# Patient Record
Sex: MALE | Race: WHITE | NOT HISPANIC OR LATINO | Employment: OTHER | URBAN - METROPOLITAN AREA
[De-identification: names, ages, dates, MRNs, and addresses within clinical notes are randomized per-mention and may not be internally consistent; named-entity substitution may affect disease eponyms.]

---

## 2017-02-01 ENCOUNTER — GENERIC CONVERSION - ENCOUNTER (OUTPATIENT)
Dept: OTHER | Facility: OTHER | Age: 67
End: 2017-02-01

## 2017-02-14 ENCOUNTER — GENERIC CONVERSION - ENCOUNTER (OUTPATIENT)
Dept: OTHER | Facility: OTHER | Age: 67
End: 2017-02-14

## 2018-01-10 NOTE — CONSULTS
Chief Complaint  Pre-op physical total knee replacement, D O S  02/17/2016, [facility]  History of Present Illness  Pre-Op Visit (Brief): The patient is being seen for a preoperative visit  The procedure is a(n) Total knee replacement left scheduled for 2/17/16 with Dr Lauren Gregg  The indication for surgery is Chronic pain and instability left knee  Surgical Risk Assessment:   Prior Anesthesia: He had prior anesthesia, no prior adverse reaction to edidural anesthesia, no prior adverse reaction to spinal anesthesia and no prior adverse reaction to general anesthesia  Pertinent Past Medical History: no angina, no arrhythmia, no CAD, CAD without prior MI, CAD without recent PCI, no CHF, no chronic liver disease, no acute hepatitis, no coagulation delay, no primary hypercoagulable state, no secondary hypercoagulable state, no pulmonary embolism, no DVT, does not use anticoagulants, no diabetes, does not use insulin, no thyroid disease, no neck osteoarthrosis, no TMJ osteoarthrosis, does not wear dentures, no seizure disorder, no CVA, no asthma, no COPD, not PREET, no renal disease, no low serum albumin and no obesity  Exercise Capacity: unable to walk four blocks without symptoms and unable to walk two flights of stairs without symptoms  Lifestyle Factors: denies tobacco use and denies illegal drug use  Symptoms: no easy bleeding, no easy bruising, no frequent nosebleeds, no chest pain, no cough, no dyspnea, no edema, no palpitations and no wheezing  Pertinent Family History: no family history of an adverse reaction to anesthesia, no aneurysm, no bleeding problems, no sudden early deaths, no ischemic heart disease and no stroke  Living Situation: home is secure and supportive and no post-op concerns with his living situation  Review of Systems    Constitutional: No fever or chills, feels well, no tiredness, no recent weight gain or weight loss     ENT: no complaints of earache, no hearing loss, no nosebleeds, no nasal discharge, no sore throat, no hoarseness  Cardiovascular: No complaints of slow heart rate, no fast heart rate, no chest pain, no palpitations, no leg claudication, no lower extremity  Respiratory: No complaints of shortness of breath, no wheezing, no cough, no SOB on exertion, no orthopnea or PND  Gastrointestinal: No complaints of abdominal pain, no constipation, no nausea or vomiting, no diarrhea or bloody stools  Genitourinary: No complaints of dysuria, no incontinence, no hesitancy, no nocturia, no genital lesion, no testicular pain  Musculoskeletal: No complaints of arthralgia, no myalgias, no joint swelling or stiffness, no limb pain or swelling  Integumentary: No complaints of skin rash or skin lesions, no itching, no skin wound, no dry skin  Neurological: No compliants of headache, no confusion, no convulsions, no numbness or tingling, no dizziness or fainting, no limb weakness, no difficulty walking  Psychiatric: Is not suicidal, no sleep disturbances, no anxiety or depression, no change in personality, no emotional problems  Endocrine: No complaints of proptosis, no hot flashes, no muscle weakness, no erectile dysfunction, no deepening of the voice, no feelings of weakness  Hematologic/Lymphatic: No complaints of swollen glands, no swollen glands in the neck, does not bleed easily, no easy bruising  Active Problems    1  Bilateral hearing loss (389 9) (H91 93)   2  Hyperlipidemia (272 4) (E78 5)   3   Osteoarthritis of both knees (715 96) (M17 0)    Past Medical History    · History of Colon cancer screening (V76 51) (Z12 11)   · History of Diabetes mellitus screening (V77 1) (Z13 1)   · History of acute bronchitis (V12 69) (Z87 09)   · History of acute sinusitis (V12 69) (Z87 09)   · History of impacted cerumen (V12 49) (Z86 69)   · History of Microscopic hematuria (599 72) (R31 2)   · History of Need for pneumococcal vaccination (V03 82) (Z23)   · History of Prostate cancer screening (V76 44) (Z12 5)   · History of Vaccine for VZV (varicella-zoster virus) (V05 4) (Z23)    The active problems and past medical history were reviewed and updated today  Surgical History    · History of Appendectomy    The surgical history was reviewed and updated today  Family History    · Family history of cardiac disorder (V17 49) (Z82 49)   · Family history of diabetes mellitus (V18 0) (Z83 3)    · Family history of Type 2 Diabetes Mellitus    The family history was reviewed and updated today  Social History    · Exercising Regularly   · Former smoker (K31 66) (U30 737)   · Marital History - Currently    · Working Full Time  The social history was reviewed and updated today  The social history was reviewed and is unchanged  Current Meds   1  Simvastatin 40 MG Oral Tablet; TAKE 1 TABLET DAILY; Therapy: 70KLT5483 to (Evaluate:47Ong4826)  Requested for: 58IPI4724; Last   Rx:06Jan2016 Ordered    Allergies    1  Sulfa Drugs    Vitals  Signs [Data Includes: Current Encounter]    Systolic: 025  Diastolic: 80   Heart Rate: 68  Systolic: 512, LUE, Sitting  Diastolic: 88, LUE, Sitting  Height: 5 ft 10 5 in  Weight: 199 lb 8 0 oz  BMI Calculated: 28 22  BSA Calculated: 2 1    Physical Exam    Constitutional   General appearance: No acute distress, well appearing and well nourished  Head and Face   Head and face: Normal     Palpation of the face and sinuses: No sinus tenderness  Eyes   Conjunctiva and lids: No erythema, swelling or discharge  Pupils and irises: Equal, round, reactive to light  Ears, Nose, Mouth, and Throat   External inspection of ears and nose: Normal     Otoscopic examination: Tympanic membranes translucent with normal light reflex  Canals patent without erythema  Hearing: Normal     Nasal mucosa, septum, and turbinates: Normal without edema or erythema  Lips, teeth, and gums: Normal, good dentition      Oropharynx: Normal with no erythema, edema, exudate or lesions  Neck   Neck: Supple, symmetric, trachea midline, no masses  Thyroid: Normal, no thyromegaly  Pulmonary   Auscultation of lungs: Clear to auscultation  Cardiovascular   Auscultation of heart: Normal rate and rhythm, normal S1 and S2, no murmurs  Carotid pulses: 2+ bilaterally  Abdominal aorta: Normal     Femoral pulses: 2+ bilaterally  Pedal pulses: 2+ bilaterally  Peripheral vascular exam: Normal     Examination of extremities for edema and/or varicosities: Normal     Chest   Breasts: Normal, no dimpling or skin changes appreciated  Palpation of breasts and axillae: Normal, no masses palpated  Chest: Normal     Abdomen   Abdomen: Non-tender, no masses  Liver and spleen: No hepatomegaly or splenomegaly  Examination for hernias: No hernias appreciated  Genitourinary   Scrotal contents: Normal testes, no masses  Penis: Normal, no lesions  Lymphatic   Palpation of lymph nodes in neck: No lymphadenopathy  Palpation of lymph nodes in axillae: No lymphadenopathy  Palpation of lymph nodes in groin: No lymphadenopathy  Palpation of lymph nodes in other areas: No lymphadenopathy  Musculoskeletal   Gait and station: Normal     Inspection/palpation of digits and nails: Normal without clubbing or cyanosis  Inspection/palpation of joints, bones, and muscles: Normal     Range of motion: Normal     Stability: Normal     Muscle strength/tone: Normal     Skin   Skin and subcutaneous tissue: Normal without rashes or lesions  Palpation of skin and subcutaneous tissue: Normal turgor  Neurologic   Cranial nerves: Cranial nerves 2-12 intact  Reflexes: 2+ and symmetric  Sensation: No sensory loss  Coordination: Normal finger to nose and heel to shin  Psychiatric   Judgment and insight: Normal     Orientation to person, place and time: Normal     Recent and remote memory: Intact      Mood and affect: Normal        Assessment 1  Primary osteoarthritis of left knee (715 16) (M17 12)    Plan  Primary osteoarthritis of left knee    · Continue with our present treatment plan ; Status:Complete;   Done: 20JYB3265    Discussion/Summary  Surgical Clearance: He is at a LOW risk from a cardiovascular standpoint at this time without any additional cardiac testing  Reevaluation needed, if he should present with symptoms prior to surgery/procedure  Comments:  Patient is medically cleared for left total knee replacement under general and spinal anesthesia pending cardiac clearance  Attachments: Lab chest x-ray EKG and prior chemical stress test performed 212      54-year-old male here for preoperative medical clearance  Patient is medically cleared for left total knee replacement under general and spinal anesthesia pending cardiac clearance which is to be obtained  I have performed an examination today there are no new findings  His only active problem at this time his hyperlipidemia for which he takes simvastatin  I reviewed his labs x-ray EKG and prior nuclear stress testing all tests are in order  His EKG abnormalities have been present for several years however cardiac clearance is to be obtained in the near future  He has no symptoms or signs of cardiac disease  End of Encounter Meds    1  Simvastatin 40 MG Oral Tablet; TAKE 1 TABLET DAILY;    Therapy: 63AKV7907 to (Evaluate:34Gxj1366)  Requested for: 45NMY1161; Last   KR:[id number] Ordered    Signatures   Electronically signed by : RAHEEM Funez ; Feb 4 2016 10:32AM EST                       (Author)

## 2018-01-11 NOTE — PROGRESS NOTES
Assessment    1  Primary osteoarthritis of left knee (715 16) (M17 12)    Plan  Primary osteoarthritis of left knee    · Continue with our present treatment plan ; Status:Complete;   Done: [id number]    Discussion/Summary  Surgical Clearance: He is at a LOW risk from a cardiovascular standpoint at this time without any additional cardiac testing  Reevaluation needed, if he should present with symptoms prior to surgery/procedure  Comments:  Patient is medically cleared for left total knee replacement under general and spinal anesthesia pending cardiac clearance  Attachments: Lab chest x-ray EKG and prior chemical stress test performed 212      56-year-old male here for preoperative medical clearance  Patient is medically cleared for left total knee replacement under general and spinal anesthesia pending cardiac clearance which is to be obtained  I have performed an examination today there are no new findings  His only active problem at this time his hyperlipidemia for which he takes simvastatin  I reviewed his labs x-ray EKG and prior nuclear stress testing all tests are in order  His EKG abnormalities have been present for several years however cardiac clearance is to be obtained in the near future  He has no symptoms or signs of cardiac disease  Chief Complaint  Pre-op physical total knee replacement, D O S  02/17/2016, [facility]  History of Present Illness  Pre-Op Visit (Brief): The patient is being seen for a preoperative visit  The procedure is a(n) Total knee replacement left scheduled for 2/17/16 with Dr Jennifer Robledo  The indication for surgery is Chronic pain and instability left knee  Surgical Risk Assessment:   Prior Anesthesia: He had prior anesthesia, no prior adverse reaction to edidural anesthesia, no prior adverse reaction to spinal anesthesia and no prior adverse reaction to general anesthesia   Pertinent Past Medical History: no angina, no arrhythmia, no CAD, CAD without prior MI, CAD without recent PCI, no CHF, no chronic liver disease, no acute hepatitis, no coagulation delay, no primary hypercoagulable state, no secondary hypercoagulable state, no pulmonary embolism, no DVT, does not use anticoagulants, no diabetes, does not use insulin, no thyroid disease, no neck osteoarthrosis, no TMJ osteoarthrosis, does not wear dentures, no seizure disorder, no CVA, no asthma, no COPD, not PREET, no renal disease, no low serum albumin and no obesity  Exercise Capacity: unable to walk four blocks without symptoms and unable to walk two flights of stairs without symptoms  Lifestyle Factors: denies tobacco use and denies illegal drug use  Symptoms: no easy bleeding, no easy bruising, no frequent nosebleeds, no chest pain, no cough, no dyspnea, no edema, no palpitations and no wheezing  Pertinent Family History: no family history of an adverse reaction to anesthesia, no aneurysm, no bleeding problems, no sudden early deaths, no ischemic heart disease and no stroke  Living Situation: home is secure and supportive and no post-op concerns with his living situation  Review of Systems    Constitutional: No fever or chills, feels well, no tiredness, no recent weight gain or weight loss  ENT: no complaints of earache, no hearing loss, no nosebleeds, no nasal discharge, no sore throat, no hoarseness  Cardiovascular: No complaints of slow heart rate, no fast heart rate, no chest pain, no palpitations, no leg claudication, no lower extremity  Respiratory: No complaints of shortness of breath, no wheezing, no cough, no SOB on exertion, no orthopnea or PND  Gastrointestinal: No complaints of abdominal pain, no constipation, no nausea or vomiting, no diarrhea or bloody stools  Genitourinary: No complaints of dysuria, no incontinence, no hesitancy, no nocturia, no genital lesion, no testicular pain     Musculoskeletal: No complaints of arthralgia, no myalgias, no joint swelling or stiffness, no limb pain or swelling  Integumentary: No complaints of skin rash or skin lesions, no itching, no skin wound, no dry skin  Neurological: No compliants of headache, no confusion, no convulsions, no numbness or tingling, no dizziness or fainting, no limb weakness, no difficulty walking  Psychiatric: Is not suicidal, no sleep disturbances, no anxiety or depression, no change in personality, no emotional problems  Endocrine: No complaints of proptosis, no hot flashes, no muscle weakness, no erectile dysfunction, no deepening of the voice, no feelings of weakness  Hematologic/Lymphatic: No complaints of swollen glands, no swollen glands in the neck, does not bleed easily, no easy bruising  Active Problems    1  Bilateral hearing loss (389 9) (H91 93)   2  Hyperlipidemia (272 4) (E78 5)   3  Osteoarthritis of both knees (715 96) (M17 0)    Past Medical History    · History of Colon cancer screening (V76 51) (Z12 11)   · History of Diabetes mellitus screening (V77 1) (Z13 1)   · History of acute bronchitis (V12 69) (Z87 09)   · History of acute sinusitis (V12 69) (Z87 09)   · History of impacted cerumen (V12 49) (Z86 69)   · History of Microscopic hematuria (599 72) (R31 2)   · History of Need for pneumococcal vaccination (V03 82) (Z23)   · History of Prostate cancer screening (V76 44) (Z12 5)   · History of Vaccine for VZV (varicella-zoster virus) (V05 4) (Z23)    The active problems and past medical history were reviewed and updated today  Surgical History    · History of Appendectomy    The surgical history was reviewed and updated today  Family History    · Family history of cardiac disorder (V17 49) (Z82 49)   · Family history of diabetes mellitus (V18 0) (Z83 3)    · Family history of Type 2 Diabetes Mellitus    The family history was reviewed and updated today         Social History    · Exercising Regularly   · Former smoker (A09 46) (W58 008)   · Marital History - Currently    · Working Full Time  The social history was reviewed and updated today  The social history was reviewed and is unchanged  Current Meds   1  Simvastatin 40 MG Oral Tablet; TAKE 1 TABLET DAILY; Therapy: 48DYZ2907 to (Evaluate:45Ecj5500)  Requested for: 02PSW5351; Last   Rx:[id number] Ordered    Allergies    1  Sulfa Drugs    Vitals   Recorded: 52KWD5894 10:19AM Recorded: 40BGB5990 10:03AM   Heart Rate  68   Systolic 688 997, LUE, Sitting   Diastolic 80 88, LUE, Sitting   Height  5 ft 10 5 in   Weight  199 lb 8 0 oz   BMI Calculated  28 22   BSA Calculated  2 1     Physical Exam    Constitutional   General appearance: No acute distress, well appearing and well nourished  Head and Face   Head and face: Normal     Palpation of the face and sinuses: No sinus tenderness  Eyes   Conjunctiva and lids: No erythema, swelling or discharge  Pupils and irises: Equal, round, reactive to light  Ears, Nose, Mouth, and Throat   External inspection of ears and nose: Normal     Otoscopic examination: Tympanic membranes translucent with normal light reflex  Canals patent without erythema  Hearing: Normal     Nasal mucosa, septum, and turbinates: Normal without edema or erythema  Lips, teeth, and gums: Normal, good dentition  Oropharynx: Normal with no erythema, edema, exudate or lesions  Neck   Neck: Supple, symmetric, trachea midline, no masses  Thyroid: Normal, no thyromegaly  Pulmonary   Auscultation of lungs: Clear to auscultation  Cardiovascular   Auscultation of heart: Normal rate and rhythm, normal S1 and S2, no murmurs  Carotid pulses: 2+ bilaterally  Abdominal aorta: Normal     Femoral pulses: 2+ bilaterally  Pedal pulses: 2+ bilaterally  Peripheral vascular exam: Normal     Examination of extremities for edema and/or varicosities: Normal     Chest   Breasts: Normal, no dimpling or skin changes appreciated  Palpation of breasts and axillae: Normal, no masses palpated      Chest: Normal     Abdomen   Abdomen: Non-tender, no masses  Liver and spleen: No hepatomegaly or splenomegaly  Examination for hernias: No hernias appreciated  Genitourinary   Scrotal contents: Normal testes, no masses  Penis: Normal, no lesions  Lymphatic   Palpation of lymph nodes in neck: No lymphadenopathy  Palpation of lymph nodes in axillae: No lymphadenopathy  Palpation of lymph nodes in groin: No lymphadenopathy  Palpation of lymph nodes in other areas: No lymphadenopathy  Musculoskeletal   Gait and station: Normal     Inspection/palpation of digits and nails: Normal without clubbing or cyanosis  Inspection/palpation of joints, bones, and muscles: Normal     Range of motion: Normal     Stability: Normal     Muscle strength/tone: Normal     Skin   Skin and subcutaneous tissue: Normal without rashes or lesions  Palpation of skin and subcutaneous tissue: Normal turgor  Neurologic   Cranial nerves: Cranial nerves 2-12 intact  Reflexes: 2+ and symmetric  Sensation: No sensory loss  Coordination: Normal finger to nose and heel to shin  Psychiatric   Judgment and insight: Normal     Orientation to person, place and time: Normal     Recent and remote memory: Intact  Mood and affect: Normal        End of Encounter Meds    1  Simvastatin 40 MG Oral Tablet; TAKE 1 TABLET DAILY; Therapy: 13CAJ7364 to (Claude Seashore)  Requested for: 02THL6215; Last   Rx:06Jan2016 Ordered    Future Appointments    Date/Time Provider Specialty Site   12/19/2016 01:00 PM RAHEEM Hancock   Family Medicine [facility]     Signatures   Electronically signed by : RAHEEM Dsouza ; Feb 4 2016 10:32AM EST                       (Author)

## 2018-01-17 NOTE — PROGRESS NOTES
Assessment    1  Encounter for preventive health examination (V70 0) (Z00 00)    Plan  Health Maintenance    · Alcohol misuse can be a problem with advancing age ; Status:Complete;   Done:  15Dld1797   · Always use a seat belt and shoulder strap when riding or driving a motor vehicle ;  Status:Complete;   Done: 78JOD6462   · Take steps to avoid hypothermia ; Status:Complete;   Done: 67QKP4405   · There are many exercise options for seniors ; Status:Complete;   Done: 69XPA3261   · These are things you can do to prevent falls in and around the home ; Status:Complete;    Done: 08MKG7670   · Use a sun block product with an SPF of 15 or more ; Status:Complete;   Done:  26JDV9389   · We encourage all of our patients to exercise regularly  30 minutes of exercise or physical  activity five or more days a week is recommended for children and adults ;  Status:Complete;   Done: 37OLU7857   · We recommend routine visits to a dentist ; Status:Complete;   Done: 31VCL6619   · We recommend that you create an advance directive ; Status:Complete;   Done:  22BKF7359   · We recommend that you follow the "Mediterranean diet "; Status:Complete;   Done:  80BQT3835   · We recommend that you follow these rules for gun safety ; Status:Complete;   Done:  11GJG3400   · We recommend that you follow these steps to lower your risk of osteoporosis  ;  Status:Complete;   Done: 99NTL4053   · We recommend you modify your diet to achieve and maintain a healthy weight  Being  underweight may increase your risk of developing health problems from vitamin and  mineral deficiencies  We recommend a balanced diet rich in fruits and vegetables  You  may also consider increasing your calorie intake by eating more frequently or adding  nuts, avocados, and low-fat cheese or milk to your meals  Please let us know  if you would like to learn more about your nutrition and calorie needs, and additional  options to help you achieve your weight goals  ; Status:Complete;   Done: 28BTU3511   · Call [phone] if: You have any warning signs of skin cancer ; Status:Complete;    Done: 44DRK4421   · Call 911 if: You experience a new kind of chest pain (angina) or pressure ;  Status:Complete;   Done: 09GFQ7520   · Follow-up visit in 1 year Evaluation and Treatment  Follow-up  Status: Hold For -  Scheduling  Requested for: 25Tem6913  Need for pneumococcal vaccination    · Prevnar 13 Intramuscular Suspension  Screening for colon cancer    · COLONOSCOPY; Status:Active; Requested for:12Fuq9949;    · Swapna Brown MD, [facility] Arenas  (Gastroenterology) Physician Referral  Consult Only: the  expectation is that the referring provider will communicate back to the patient on  treatment options  Evaluation and Treatment: the expectation is that the referred to  provider will communicate back to the patient on treatment options  Status: Active   Requested for: 67Qeh8941  Care Summary provided  : Yes    Discussion/Summary    9the 8year-old man here today for subsequent annual wellness visit  I'm giving him the 5 wishes and instructing him to prepare living will for himself  I'm giving him Prevnar 15 and a referral for colonoscopy which she is overdue  He is had all the other preventive services  Limited examination performed today including prostate examination no new findings  I reviewed his overall management and his medication will make no other changes  Will reappoint in one year for  Impression: Subsequent Annual Wellness Visit  Cardiovascular screening and counseling: the risks and benefits of screening were discussed, screening is current and EKG recommended  Diabetes screening and counseling: the risks and benefits of screening were discussed and screening is current  Colorectal cancer screening and counseling: the risks and benefits of screening were discussed and due for a colonoscopy (low risk)     Prostate cancer screening and counseling: the risks and benefits of screening were discussed and screening is current  Osteoporosis screening and counseling: the risks and benefits of screening were discussed and screening not indicated  Abdominal aortic aneurysm screening and counseling: the risks and benefits of screening were discussed and screening not indicated  Glaucoma screening and counseling: the risks and benefits of screening were discussed and screening is current  HIV screening and counseling: the risks and benefits of screening were discussed and screening not indicated  Immunizations: influenza vaccine is up to date this year, the risks and benefits of pneumococcal vaccination were discussed with the patient, pneumococcal vaccine due today, Cglihiz50, hepatitis B prevention counseling was provided, hepatitis B vaccination series is not indicated at this time due to the patient's low risk of isis the disease, the risks and benefits of the Zostavax vaccine were discussed with the patient, Zostavax vaccination status is unknown, the risks and benefits of the Tdap vaccine were discussed with the patient and Tdap vaccination up to date  Advance Directive Planning: not complete  Patient Discussion: plan discussed with the patient, follow-up visit needed in one year  Chief Complaint  77 y/o annual physical emax      Advance Directives  Advance Directive [facility]:   NO - Patient does not have an advance health care directive  Summary of Advance Directive Conversation  Wishes given to the patient and instructed in  The provider spent 1 minutes discussing Advance Directives  History of Present Illness  Welcome to Medicare and Wellness Visits: The patient is being seen for the subsequent annual wellness visit  Medicare Screening and Risk Factors   Hospitalizations: no previous hospitalizations  Once per lifetime medicare screening tests: ECG (1/19/16LBBB) and AAA screening US has not yet been done    Medicare Screening Tests Risk Questions Abdominal aortic aneurysm risk assessment: none indicated  Osteoporosis risk assessment: none indicated  HIV risk assessment: none indicated  Drug and Alcohol Use: The patient is a former cigarette smoker  The patient reports occasional alcohol use  He has never used illicit drugs  Diet and Physical Activity: Current diet includes well balanced meals  He exercises 3 times per week  Exercise: strength training 3 hours per week  Mood Disorder and Cognitive Impairment Screening: Anxiety screening none  He denies feeling down, depressed, or hopeless over the past two weeks  He denies feeling little interest or pleasure in doing things over the past two weeks  Cognitive impairment screening: denies difficulty learning/retaining new information, denies difficulty handling complex tasks, denies difficulty with reasoning, denies difficulty with spatial ability and orientation and denies difficulty with language  Functional Ability/Level of Safety: Hearing is significantly decreased and a hearing aid is used  The patient is currently able to do activities of daily living without limitations, able to do instrumental activities of daily living without limitations, able to participate in social activities without limitations and able to drive without limitations  Activities of daily living details: does not need help using the phone, no transportation help needed, does not need help shopping, no meal preparation help needed, does not need help doing housework, does not need help doing laundry, does not need help managing medications and does not need help managing money  Fall risk factors: The patient fell 0 times in the past 12 months , no polypharmacy, no alcohol use, no mobility impairment, no antidepressant use, no deconditioning, no postural hypotension, no sedative use, no urinary incontinence, no antihypertensive use, no cognitive impairment, up and go test was normal and no previous fall   Home safety risk factors:  no unfamiliar surroundings, no loose rugs, no poor household lighting, no uneven floors, no household clutter and grab bars in the bathroom  Advance Directives: Advance directives: no living will  Co-Managers and Medical Equipment/Suppliers: See Patient Care Team   Reviewed Updated [facility]:   Last Medicare Wellness Visit Information was reviewed, patient interviewed, no change since last AWV  Review of Systems    Constitutional: negative  Eyes: negative  ENT: negative  Cardiovascular: negative  Respiratory: negative  Gastrointestinal: negative  Genitourinary: negative  Musculoskeletal: negative  Integumentary and Breasts: negative  Neurological: negative  Psychiatric: negative  Endocrine: negative  Hematologic and Lymphatic: negative  Active Problems    1  Bilateral hearing loss (389 9) (H91 93)   2  Hyperlipidemia (272 4) (E78 5)   3  Osteoarthritis of both knees (715 96) (M17 0)   4  Primary osteoarthritis of left knee (715 16) (M17 12)   5  Prostate cancer screening (V76 44) (Z12 5)    Past Medical History    · History of Acute laryngotracheitis (464 20) (J04 2)   · History of Colon cancer screening (V76 51) (Z12 11)   · History of Diabetes mellitus screening (V77 1) (Z13 1)   · History of acute bronchitis (V12 69) (Z87 09)   · History of acute sinusitis (V12 69) (Z87 09)   · History of impacted cerumen (V12 49) (Z86 69)   · History of Microscopic hematuria (599 72) (R31 29)   · History of Need for pneumococcal vaccination (V03 82) (Z23)   · History of Prostate cancer screening (V76 44) (Z12 5)   · History of Vaccine for VZV (varicella-zoster virus) (V05 4) (Z23)    The active problems and past medical history were reviewed and updated today  Surgical History    · History of Appendectomy    The surgical history was reviewed and updated today         Family History  Father    · Family history of cardiac disorder (V17 49) (Z82 49)   · Family history of diabetes mellitus (V18 0) (Z83 3)  Family History    · Family history of Type 2 Diabetes Mellitus    The family history was reviewed and updated today  Social History    · Exercising Regularly   · Former smoker (R93 40) (H15 801)   · Marital History - Currently    · Working Full Time  The social history was reviewed and updated today  The social history was reviewed and is unchanged  Current Meds   1  Simvastatin 40 MG Oral Tablet; TAKE 1 TABLET DAILY; Therapy: 78EPC2399 to (Evaluate:58Swf9634)  Requested for: 79SJD8855; Last   Rx:27Sfm7713 Ordered    The medication list was reviewed and updated today  Allergies    1  Sulfa Drugs    Immunizations   1 2    Influenza  24-Oct-2016     PPSV  Temporarily Deferred: Pt requests deferral Temporarily Deferred: Pt refuses    Zoster  Temporarily Deferred: Pt requests deferral      Vitals  Signs    Systolic: 446  Diastolic: 80   Temperature: 97 5 F, Tympanic  Heart Rate: 74, L Radial  Pulse Quality: Norm  Systolic: 607, LUE, Sitting  Diastolic: 90, LUE, Sitting  Height: 5 ft 10 5 in  Weight: 195 lb 8 0 oz  BMI Calculated: 27 65  BSA Calculated: 2 08    Physical Exam    Constitutional   General appearance: No acute distress, well appearing and well nourished  Eyes   Pupils and irises: Equal, round, reactive to light  Ears, Nose, Mouth, and Throat   Otoscopic examination: Tympanic membranes translucent with normal light reflex  Canals patent without erythema  Hearing: Abnormal   Hearing in the right ear: diminished  Hearing in the left ear: dimished  Oropharynx: Normal with no erythema, edema, exudate or lesions  Neck   Neck: Supple, symmetric, trachea midline, no masses  Thyroid: Normal, no thyromegaly  Pulmonary   Auscultation of lungs: Clear to auscultation  Cardiovascular   Auscultation of heart: Normal rate and rhythm, normal S1 and S2, no murmurs  Genitourinary   Scrotal contents: Normal testes, no masses      Penis: Normal, no lesions  Digital rectal exam of prostate: Abnormal   The prostate was enlarged, but had no palpable nodules, was nontender and was not fluctuant  Results/Data  Falls Risk Assessment (Dx Z13 89 Screen for Neurologic Disorder) 01Xof1499 11:06AM [facility]     Test Name Result Flag Reference   Falls Risk      No falls in the past year     PHQ-2 Adult Depression Screening 90Iyq2120 11:06AM [facility]     Test Name Result Flag Reference   PHQ-2 Adult Depression Score 0     Over the last two weeks, how often have you been bothered by any of the following problems? Little interest or pleasure in doing things: Not at all - 0  Feeling down, depressed, or hopeless: Not at all - 0   PHQ-2 Adult Depression Screening Negative         Procedure    Procedure: Visual Acuity Test    Indication: routine screening  Inforrmation supplied by a Snellen chart  Results: 20/25 in the right eye with corrective device, 20/20 in the left eye with corrective device      Health Management  Health Maintenance   (1) PSA (SCREEN) (Dx V76 44 Screen for Prostate Cancer); every 1 week; Last  26WIG0540; Next Due: 58Mno0814; Overdue    Future Appointments    Date/Time Provider Specialty Site   01/12/2018 01:00 PM RAHEEM King   Family Medicine [facility], [place], [facility]     Signatures   Electronically signed by : RAHEEM Winston ; Dec 23 2016 11:53AM EST                       (Author)

## 2018-03-08 DIAGNOSIS — Z13.6 SCREENING FOR CARDIOVASCULAR CONDITION: ICD-10-CM

## 2018-03-08 DIAGNOSIS — Z13.1 SCREENING FOR DIABETES MELLITUS: ICD-10-CM

## 2018-03-08 DIAGNOSIS — Z12.5 SCREENING PSA (PROSTATE SPECIFIC ANTIGEN): ICD-10-CM

## 2018-03-08 DIAGNOSIS — E78.5 DYSLIPIDEMIA: Primary | ICD-10-CM

## 2018-03-08 DIAGNOSIS — M17.0 PRIMARY OSTEOARTHRITIS OF BOTH KNEES: ICD-10-CM

## 2018-03-13 ENCOUNTER — APPOINTMENT (OUTPATIENT)
Dept: LAB | Facility: CLINIC | Age: 68
End: 2018-03-13
Payer: MEDICARE

## 2018-03-13 DIAGNOSIS — E78.5 DYSLIPIDEMIA: ICD-10-CM

## 2018-03-13 DIAGNOSIS — Z13.6 SCREENING FOR CARDIOVASCULAR CONDITION: ICD-10-CM

## 2018-03-13 DIAGNOSIS — Z13.1 SCREENING FOR DIABETES MELLITUS: ICD-10-CM

## 2018-03-13 DIAGNOSIS — Z12.5 SCREENING PSA (PROSTATE SPECIFIC ANTIGEN): ICD-10-CM

## 2018-03-13 DIAGNOSIS — M17.0 PRIMARY OSTEOARTHRITIS OF BOTH KNEES: ICD-10-CM

## 2018-03-13 LAB
ALBUMIN SERPL BCP-MCNC: 3.9 G/DL (ref 3.5–5)
ALP SERPL-CCNC: 52 U/L (ref 46–116)
ALT SERPL W P-5'-P-CCNC: 30 U/L (ref 12–78)
ANION GAP SERPL CALCULATED.3IONS-SCNC: 6 MMOL/L (ref 4–13)
AST SERPL W P-5'-P-CCNC: 24 U/L (ref 5–45)
BILIRUB SERPL-MCNC: 0.66 MG/DL (ref 0.2–1)
BUN SERPL-MCNC: 17 MG/DL (ref 5–25)
CALCIUM SERPL-MCNC: 8.7 MG/DL (ref 8.3–10.1)
CHLORIDE SERPL-SCNC: 106 MMOL/L (ref 100–108)
CHOLEST SERPL-MCNC: 158 MG/DL (ref 50–200)
CO2 SERPL-SCNC: 29 MMOL/L (ref 21–32)
CREAT SERPL-MCNC: 1.05 MG/DL (ref 0.6–1.3)
GFR SERPL CREATININE-BSD FRML MDRD: 73 ML/MIN/1.73SQ M
GLUCOSE P FAST SERPL-MCNC: 97 MG/DL (ref 65–99)
HDLC SERPL-MCNC: 64 MG/DL (ref 40–60)
LDLC SERPL CALC-MCNC: 72 MG/DL (ref 0–100)
POTASSIUM SERPL-SCNC: 4.3 MMOL/L (ref 3.5–5.3)
PROT SERPL-MCNC: 7.6 G/DL (ref 6.4–8.2)
SODIUM SERPL-SCNC: 141 MMOL/L (ref 136–145)
TRIGL SERPL-MCNC: 110 MG/DL

## 2018-03-13 PROCEDURE — 80053 COMPREHEN METABOLIC PANEL: CPT

## 2018-03-13 PROCEDURE — G0103 PSA SCREENING: HCPCS

## 2018-03-13 PROCEDURE — 80061 LIPID PANEL: CPT

## 2018-03-13 PROCEDURE — 36415 COLL VENOUS BLD VENIPUNCTURE: CPT

## 2018-03-14 LAB — PSA SERPL DL<=0.01 NG/ML-MCNC: 0.8 NG/ML (ref 0–4)

## 2018-03-16 LAB — HM COLONOSCOPY: NORMAL

## 2018-03-16 RX ORDER — SIMVASTATIN 40 MG
1 TABLET ORAL DAILY
COMMUNITY
Start: 2014-05-05 | End: 2018-05-25 | Stop reason: SDUPTHER

## 2018-03-16 RX ORDER — LORAZEPAM 0.5 MG/1
1 TABLET ORAL 2 TIMES DAILY
COMMUNITY
Start: 2017-08-21 | End: 2018-03-19

## 2018-03-19 ENCOUNTER — OFFICE VISIT (OUTPATIENT)
Dept: FAMILY MEDICINE CLINIC | Facility: CLINIC | Age: 68
End: 2018-03-19
Payer: MEDICARE

## 2018-03-19 VITALS
BODY MASS INDEX: 26.95 KG/M2 | WEIGHT: 199 LBS | HEART RATE: 78 BPM | RESPIRATION RATE: 14 BRPM | DIASTOLIC BLOOD PRESSURE: 80 MMHG | HEIGHT: 72 IN | SYSTOLIC BLOOD PRESSURE: 138 MMHG | TEMPERATURE: 98.5 F

## 2018-03-19 DIAGNOSIS — Z00.00 MEDICARE ANNUAL WELLNESS VISIT, SUBSEQUENT: Primary | ICD-10-CM

## 2018-03-19 PROCEDURE — G0439 PPPS, SUBSEQ VISIT: HCPCS | Performed by: FAMILY MEDICINE

## 2018-03-19 NOTE — PROGRESS NOTES
Assessment/Plan:     Diagnoses and all orders for this visit:    Medicare annual wellness visit, subsequent    Cont meds   Otherwise health maint is up to date   And no other issues today            Subjective:     Chief Complaint   Patient presents with   Brett Holter Wellness Visit     75year old  Patient ID: Tony Rowland is a 75 y o  male  Here for awv  No acute complaints  meds reviwewed and are great           The following portions of the patient's history were reviewed and updated as appropriate: allergies, current medications, past family history, past medical history, past social history, past surgical history and problem list     Review of Systems   Constitutional: Negative  HENT: Negative  Eyes: Negative  Respiratory: Negative  Cardiovascular: Negative  Gastrointestinal: Negative  Endocrine: Negative  Genitourinary: Negative  Musculoskeletal: Negative  Skin: Negative  Allergic/Immunologic: Negative  Neurological: Negative  Hematological: Negative  Psychiatric/Behavioral: Negative  All other systems reviewed and are negative  Objective:    Vitals:    03/19/18 0952   BP: 138/80   BP Location: Right arm   Patient Position: Sitting   Cuff Size: Standard   Pulse: 78   Resp: 14   Temp: 98 5 °F (36 9 °C)   TempSrc: Tympanic   Weight: 90 3 kg (199 lb)   Height: 6' (1 829 m)          Physical Exam   Constitutional: He is oriented to person, place, and time  He appears well-developed and well-nourished  No distress  HENT:   Head: Normocephalic  Right Ear: External ear normal    Left Ear: External ear normal    Nose: Nose normal    Mouth/Throat: Oropharynx is clear and moist    Eyes: Conjunctivae and EOM are normal  Pupils are equal, round, and reactive to light  Right eye exhibits no discharge  Left eye exhibits no discharge  Neck: Normal range of motion  Cardiovascular: Normal rate, regular rhythm and normal heart sounds      Pulmonary/Chest: Effort normal and breath sounds normal    Abdominal: Soft  Bowel sounds are normal  He exhibits no distension  There is no tenderness  Musculoskeletal: Normal range of motion  Neurological: He is alert and oriented to person, place, and time  No cranial nerve deficit  Skin: Skin is warm and dry  No rash noted  Psychiatric: He has a normal mood and affect  His behavior is normal  Judgment and thought content normal    Nursing note and vitals reviewed  HPI:  Bashir Cornell is a 75 y o  male here for his Subsequent Wellness Visit  Patient Active Problem List   Diagnosis    Bilateral hearing loss    Hyperlipidemia    Osteoarthritis of both knees     Past Medical History:   Diagnosis Date    Microscopic hematuria     LAST ASSESSED [id number]     Past Surgical History:   Procedure Laterality Date    APPENDECTOMY       Family History   Problem Relation Age of Onset    Heart disease Father     Diabetes type II Father      History   Smoking Status    Former Smoker   Smokeless Tobacco    Never Used     History   Alcohol use Not on file      History   Drug use: Unknown     /80 (BP Location: Right arm, Patient Position: Sitting, Cuff Size: Standard)   Pulse 78   Temp 98 5 °F (36 9 °C) (Tympanic)   Resp 14   Ht 6' (1 829 m)   Wt 90 3 kg (199 lb)   BMI 26 99 kg/m²       Current Outpatient Prescriptions   Medication Sig Dispense Refill    simvastatin (ZOCOR) 40 mg tablet Take 1 tablet by mouth daily       No current facility-administered medications for this visit        Allergies   Allergen Reactions    Sulfa Antibiotics Rash     Immunization History   Administered Date(s) Administered    Influenza Split High Dose Preservative Free IM 10/24/2016    Pneumococcal Conjugate 13-Valent 12/23/2016       Patient Care Team:  Ron Lopez DO as PCP - General      Medicare Screening Tests and Risk Assessments:  AWV Clinical     ISAR:   Previous hospitalizations?:  No       Once in a Lifetime Medicare Screening:   EKG performed?:  Yes    AAA screening performed? (if performed, please add date to Health Maintenance):  Yes       Medicare Screening Tests and Risk Assessment:   AAA Risk Assessment     Tobacco use (males only):  No   Age over 72 (males only):  Yes Family history of AAA:  No   Osteoporosis Risk Assessment    None indicated:  Yes    HIV Risk Assessment    None indicated:  Yes        Drug and Alcohol Use:   Tobacco use    Cigarettes:  former smoker    Smokeless:  never used smokeless tobacco    Tobacco use duration    Tobacco Cessation Readiness    Alcohol use    Alcohol use:  occasional use    Alcohol Treatment Readiness   Illicit Drug Use    Drug use:  never    Drug type:  no sedative use       Diet & Exercise:   Diet   What is your diet?:  Regular, Limited junk food   How many servings a day of the following:   Exercise    Do you currently exercise?:  yes    Frequency:  daily       Cognitive Impairment Screening:   Anxiety screenings preformed:  Yes    Depression screening preformed:  Yes    Depression screening results:  no significant symptoms   Cognitive Impairment Screening    Do you have difficulty learning or retaining new information?:  No Do you have difficulty handling new tasks?:  No   Do you have difficulty with reasoning?:  No Do you have difficulty with spatial ability and orientation?:  No   Do you have difficulty with language?:  No Do you have difficulty with behavior?:  No       Functional Ability/Level of Safety:   Hearing    Hearing difficulties:  Yes Bilateral:  significantly decreased   Left:  significantly decreased Right:  significantly decreased   Hearing aid:  Yes    Hearing Impairment Assessment    Hearing status:  Hard of hearing   Current Activities    Status:  unlimited ADL's, unlimited IADL's, unlimited social activities, unlimited driving   Help needed with the folllowing:    Using the phone:  No Transportation:  No   Shopping:  No Preparing Meals:  No   Doing Housework: No Doing Laundry:  No   Managing Medications:  No Managing Money:  No   ADL    Feeding:  Independant   Oral hygiene and Facial grooming:  Independant   Bathing:  Independant   Upper Body Dressing:  Independant   Lower Body Dressing:  Independant   Toileting:  Independant   Bed Mobility:  Independant   Fall Risk   Have you fallen in the last 12 months?:  No Are you unsteady on your feet?:  No    Are you taking any medications that may cause fatigue or dizziness?:  No    Do you rush to the bathroom potentially risking a fall?:  No   Injury History   Polypharmacy:  No Antidepressant Use:  No   Sedative Use:  No Antihypertensive Use:  No   Previous Fall:  No Alcohol Use:  No   Deconditioning:  No Visual Impairment:  No   Cogitive Impairment:  No Mmobility Impairment:  No   Postural Hypotension:  No Urinary Incontinence:  No       Home Safety:   Are there hazards in your environment?:  No   Home Safety Risk Factors       Advanced Directives:   Advanced Directives    Living Will:  No Durable POA for healthcare:  No   Advanced directive:  No    Patient's End of Life Decisions    Reviewed with patient:  Yes Provider agrees with end of life decisions:  Yes       Urinary Incontinence:       Glaucoma:            Provider Screening     Preventative Screening/Counseling:   Cardiovascular Screening/Counseling:   (Labs Q5 years, EKG optional one-time)   General:  Screening Current           Diabetes Screening/Counseling:   (2 tests/year if Pre-Diabetes or 1 test/year if no Diabetes)   General:  Screening Current           Colorectal Cancer Screening/Counseling:   (FOBT Q1 yr; Flex Sig Q4 yrs or Q10 yrs after Screening Colonoscopy; Screening Colonoscpy Q2 yrs High Risk or Q10 yrs Low Risk; Barium Enema Q2 yrs High Risk or Q4 yrs Low Risk)   General:  Screening Current           Prostate Cancer Screening/Counseling:   (Annual)    General:  Screening Current          Breast Cancer Screening/Counseling:   (Baseline Age 28 - 43; Annual Age 36+)         Cervical Cancer Screening/Counseling:   (Annual for High Risk or Childbearing Age with Abnormal Pap in Last 3 yrs; Every 2 all others)         Osteoporosis Screening/Counseling:   (Every 2 Yrs if at risk or more if medically necessary)   General:  Patient Declines           AAA Screening/Counseling:   (Once per Lifetime with risk factors)    Family History of AAA:  No Age over 72 (males only):  Yes Tobacco use (males only):  No   General:  Screening Not Indicated           Glaucoma Screening/Counseling:   (Annual)   General:  Screening Current          HIV Screening/Counseling:   (Voluntary; Once annually for high risk OR 3 times for Pregnancy at diagnosis of IUP; 3rd trimester; and at Labor   General:  Screening Not Indicated           Hepatitis C Screening:             Immunizations:   Influenza (annual):   Influenza Recommended Annually, Influenza UTD This Year   Pneumococcal (Once in a Lifetime):  Lifetime Vaccine Completed   Hepatitis B Series (low risk patients):  Series Not Indicated   Hepatitis B Series (medium to high risk patients scheduled series):  Vaccine Status Unknown   Zostavax (Medicare D Coverage, Pt >66 yo):  Zostavax Vaccine UTD   TD (Non-Medicare Wellness  Visit required):  Vaccine Status Unknown   Tdap (Non-Medicare Wellness Visit required):  Vaccine Status Unknown       Other Preventative Couseling (Non-Medicare Wellness Visit Required):   nutrition counseling performed, alcohol use counseling provided, Increased physical activity counseling given       Referrals (Non-Medicare Wellness Visit Required):       Medical Equipment/Suppliers:            Visual Acuity Screening    Right eye Left eye Both eyes   Without correction:      With correction: 20/20 20/20      Reviewed Updated [facility] Prior Wellness Visits:   Last Medicare wellness visit information was reviewed, patient interviewed , no change since last AWVyes  Last Medicare wellness visit information was reviewed, patient interviewed and updates made to the record today yes    Assessment and Plan:  1   Medicare annual wellness visit, subsequent         Health Maintenance Due   Topic Date Due    Hepatitis C Screening  1950    Depression Screening PHQ-9  09/17/1961    DTaP,Tdap,and Td Vaccines (1 - Tdap) 09/17/1970    Fall Risk  09/17/2014    ABDOMINAL AORTIC ANEURYSM (AAA) SCREEN  09/17/2014    PNEUMOCOCCAL POLYSACCHARIDE VACCINE AGE 72 AND OVER  09/17/2014    GLAUCOMA SCREENING 67+ YR  09/17/2016

## 2018-05-25 DIAGNOSIS — E78.5 HYPERLIPIDEMIA, UNSPECIFIED HYPERLIPIDEMIA TYPE: Primary | ICD-10-CM

## 2018-05-25 RX ORDER — SIMVASTATIN 40 MG
40 TABLET ORAL DAILY
Qty: 90 TABLET | Refills: 1 | Status: SHIPPED | OUTPATIENT
Start: 2018-05-25

## 2019-11-05 ENCOUNTER — TELEPHONE (OUTPATIENT)
Dept: FAMILY MEDICINE CLINIC | Facility: CLINIC | Age: 69
End: 2019-11-05

## 2020-01-21 ENCOUNTER — TELEPHONE (OUTPATIENT)
Dept: GASTROENTEROLOGY | Facility: CLINIC | Age: 70
End: 2020-01-21

## 2020-01-21 NOTE — TELEPHONE ENCOUNTER
Tried calling listed home and cell numbers- home line didn't ring and cell number rang-out then sounded like it was hung-up   Unable to leave message

## 2020-01-28 NOTE — TELEPHONE ENCOUNTER
Dr Benoit Acevedo to reach pt by phone-no current numbers are available  There has been no response from recall letter-please advise   Thank you